# Patient Record
Sex: FEMALE | Race: WHITE | NOT HISPANIC OR LATINO | Employment: OTHER | URBAN - METROPOLITAN AREA
[De-identification: names, ages, dates, MRNs, and addresses within clinical notes are randomized per-mention and may not be internally consistent; named-entity substitution may affect disease eponyms.]

---

## 2024-07-20 ENCOUNTER — HOSPITAL ENCOUNTER (OUTPATIENT)
Facility: HOSPITAL | Age: 84
Discharge: HOME | End: 2024-07-22
Attending: STUDENT IN AN ORGANIZED HEALTH CARE EDUCATION/TRAINING PROGRAM | Admitting: INTERNAL MEDICINE
Payer: MEDICARE

## 2024-07-20 DIAGNOSIS — R06.02 SHORTNESS OF BREATH: ICD-10-CM

## 2024-07-20 DIAGNOSIS — I26.99 PULMONARY EMBOLISM: ICD-10-CM

## 2024-07-20 DIAGNOSIS — I26.99 PULMONARY EMBOLI: ICD-10-CM

## 2024-07-20 DIAGNOSIS — R07.9 CHEST PAIN: ICD-10-CM

## 2024-07-20 DIAGNOSIS — I26.99 ACUTE PULMONARY EMBOLISM WITHOUT ACUTE COR PULMONALE, UNSPECIFIED PULMONARY EMBOLISM TYPE: Primary | ICD-10-CM

## 2024-07-20 LAB
ALBUMIN SERPL-MCNC: 3.5 G/DL (ref 3.4–4.8)
ALBUMIN/GLOB SERPL: 1.1 RATIO (ref 1.1–2)
ALP SERPL-CCNC: 60 UNIT/L (ref 40–150)
ALT SERPL-CCNC: 132 UNIT/L (ref 0–55)
ANION GAP SERPL CALC-SCNC: 9 MEQ/L
AST SERPL-CCNC: 68 UNIT/L (ref 5–34)
BACTERIA #/AREA URNS AUTO: ABNORMAL /HPF
BASOPHILS # BLD AUTO: 0.02 X10(3)/MCL
BASOPHILS NFR BLD AUTO: 0.3 %
BILIRUB SERPL-MCNC: 1.4 MG/DL
BILIRUB UR QL STRIP.AUTO: NEGATIVE
BNP BLD-MCNC: 80.3 PG/ML
BUN SERPL-MCNC: 25.6 MG/DL (ref 9.8–20.1)
CALCIUM SERPL-MCNC: 9.6 MG/DL (ref 8.4–10.2)
CAOX CRY UR QL COMP ASSIST: ABNORMAL
CHLORIDE SERPL-SCNC: 105 MMOL/L (ref 98–107)
CLARITY UR: ABNORMAL
CO2 SERPL-SCNC: 20 MMOL/L (ref 23–31)
COLOR UR AUTO: YELLOW
CREAT SERPL-MCNC: 1.28 MG/DL (ref 0.55–1.02)
CREAT/UREA NIT SERPL: 20
EOSINOPHIL # BLD AUTO: 0.06 X10(3)/MCL (ref 0–0.9)
EOSINOPHIL NFR BLD AUTO: 0.9 %
ERYTHROCYTE [DISTWIDTH] IN BLOOD BY AUTOMATED COUNT: 15.5 % (ref 11.5–17)
GFR SERPLBLD CREATININE-BSD FMLA CKD-EPI: 42 ML/MIN/1.73/M2
GLOBULIN SER-MCNC: 3.2 GM/DL (ref 2.4–3.5)
GLUCOSE SERPL-MCNC: 137 MG/DL (ref 82–115)
GLUCOSE UR QL STRIP: NORMAL
HCT VFR BLD AUTO: 43.8 % (ref 37–47)
HGB BLD-MCNC: 14.8 G/DL (ref 12–16)
HGB UR QL STRIP: NEGATIVE
HYALINE CASTS #/AREA URNS LPF: ABNORMAL /LPF
IMM GRANULOCYTES # BLD AUTO: 0.05 X10(3)/MCL (ref 0–0.04)
IMM GRANULOCYTES NFR BLD AUTO: 0.8 %
KETONES UR QL STRIP: ABNORMAL
LEUKOCYTE ESTERASE UR QL STRIP: 250
LYMPHOCYTES # BLD AUTO: 1.43 X10(3)/MCL (ref 0.6–4.6)
LYMPHOCYTES NFR BLD AUTO: 22.2 %
MCH RBC QN AUTO: 30.6 PG (ref 27–31)
MCHC RBC AUTO-ENTMCNC: 33.8 G/DL (ref 33–36)
MCV RBC AUTO: 90.5 FL (ref 80–94)
MONOCYTES # BLD AUTO: 0.63 X10(3)/MCL (ref 0.1–1.3)
MONOCYTES NFR BLD AUTO: 9.8 %
MUCOUS THREADS URNS QL MICRO: ABNORMAL /LPF
NEUTROPHILS # BLD AUTO: 4.24 X10(3)/MCL (ref 2.1–9.2)
NEUTROPHILS NFR BLD AUTO: 66 %
NITRITE UR QL STRIP: NEGATIVE
NRBC BLD AUTO-RTO: 0 %
PH UR STRIP: 5.5 [PH]
PLATELET # BLD AUTO: 231 X10(3)/MCL (ref 130–400)
PMV BLD AUTO: 12.8 FL (ref 7.4–10.4)
POTASSIUM SERPL-SCNC: 4.8 MMOL/L (ref 3.5–5.1)
PROT SERPL-MCNC: 6.7 GM/DL (ref 5.8–7.6)
PROT UR QL STRIP: ABNORMAL
RBC # BLD AUTO: 4.84 X10(6)/MCL (ref 4.2–5.4)
RBC #/AREA URNS AUTO: ABNORMAL /HPF
SODIUM SERPL-SCNC: 134 MMOL/L (ref 136–145)
SP GR UR STRIP.AUTO: 1.04 (ref 1–1.03)
SQUAMOUS #/AREA URNS LPF: ABNORMAL /HPF
TROPONIN I SERPL-MCNC: <0.01 NG/ML (ref 0–0.04)
UROBILINOGEN UR STRIP-ACNC: 2
WBC # BLD AUTO: 6.43 X10(3)/MCL (ref 4.5–11.5)
WBC #/AREA URNS AUTO: ABNORMAL /HPF

## 2024-07-20 PROCEDURE — 25000003 PHARM REV CODE 250: Performed by: STUDENT IN AN ORGANIZED HEALTH CARE EDUCATION/TRAINING PROGRAM

## 2024-07-20 PROCEDURE — 81001 URINALYSIS AUTO W/SCOPE: CPT | Performed by: NURSE PRACTITIONER

## 2024-07-20 PROCEDURE — G0378 HOSPITAL OBSERVATION PER HR: HCPCS

## 2024-07-20 PROCEDURE — 99285 EMERGENCY DEPT VISIT HI MDM: CPT | Mod: 25

## 2024-07-20 PROCEDURE — 25500020 PHARM REV CODE 255: Performed by: STUDENT IN AN ORGANIZED HEALTH CARE EDUCATION/TRAINING PROGRAM

## 2024-07-20 PROCEDURE — 87086 URINE CULTURE/COLONY COUNT: CPT | Performed by: NURSE PRACTITIONER

## 2024-07-20 PROCEDURE — 83880 ASSAY OF NATRIURETIC PEPTIDE: CPT | Performed by: NURSE PRACTITIONER

## 2024-07-20 PROCEDURE — 93005 ELECTROCARDIOGRAM TRACING: CPT

## 2024-07-20 PROCEDURE — 93010 ELECTROCARDIOGRAM REPORT: CPT | Mod: 76,,, | Performed by: INTERNAL MEDICINE

## 2024-07-20 PROCEDURE — 25000003 PHARM REV CODE 250: Performed by: INTERNAL MEDICINE

## 2024-07-20 PROCEDURE — 84484 ASSAY OF TROPONIN QUANT: CPT | Performed by: NURSE PRACTITIONER

## 2024-07-20 PROCEDURE — 80053 COMPREHEN METABOLIC PANEL: CPT | Performed by: NURSE PRACTITIONER

## 2024-07-20 PROCEDURE — 85025 COMPLETE CBC W/AUTO DIFF WBC: CPT | Performed by: NURSE PRACTITIONER

## 2024-07-20 PROCEDURE — 96361 HYDRATE IV INFUSION ADD-ON: CPT

## 2024-07-20 PROCEDURE — 96372 THER/PROPH/DIAG INJ SC/IM: CPT | Performed by: STUDENT IN AN ORGANIZED HEALTH CARE EDUCATION/TRAINING PROGRAM

## 2024-07-20 PROCEDURE — 96374 THER/PROPH/DIAG INJ IV PUSH: CPT

## 2024-07-20 PROCEDURE — 99291 CRITICAL CARE FIRST HOUR: CPT

## 2024-07-20 PROCEDURE — 87077 CULTURE AEROBIC IDENTIFY: CPT | Performed by: NURSE PRACTITIONER

## 2024-07-20 PROCEDURE — 96375 TX/PRO/DX INJ NEW DRUG ADDON: CPT

## 2024-07-20 PROCEDURE — 63600175 PHARM REV CODE 636 W HCPCS: Performed by: STUDENT IN AN ORGANIZED HEALTH CARE EDUCATION/TRAINING PROGRAM

## 2024-07-20 PROCEDURE — 93010 ELECTROCARDIOGRAM REPORT: CPT | Mod: ,,, | Performed by: INTERNAL MEDICINE

## 2024-07-20 RX ORDER — ONDANSETRON HYDROCHLORIDE 2 MG/ML
4 INJECTION, SOLUTION INTRAVENOUS EVERY 8 HOURS PRN
Status: DISCONTINUED | OUTPATIENT
Start: 2024-07-20 | End: 2024-07-22 | Stop reason: HOSPADM

## 2024-07-20 RX ORDER — LIDOCAINE HYDROCHLORIDE 20 MG/ML
15 SOLUTION OROPHARYNGEAL ONCE
Status: COMPLETED | OUTPATIENT
Start: 2024-07-20 | End: 2024-07-20

## 2024-07-20 RX ORDER — ACETAMINOPHEN 325 MG/1
650 TABLET ORAL EVERY 8 HOURS PRN
Status: DISCONTINUED | OUTPATIENT
Start: 2024-07-20 | End: 2024-07-22 | Stop reason: HOSPADM

## 2024-07-20 RX ORDER — MORPHINE SULFATE 4 MG/ML
4 INJECTION, SOLUTION INTRAMUSCULAR; INTRAVENOUS
Status: COMPLETED | OUTPATIENT
Start: 2024-07-20 | End: 2024-07-20

## 2024-07-20 RX ORDER — SPIRONOLACTONE 50 MG/1
50 TABLET, FILM COATED ORAL DAILY
COMMUNITY

## 2024-07-20 RX ORDER — ENOXAPARIN SODIUM 100 MG/ML
1 INJECTION SUBCUTANEOUS
Status: COMPLETED | OUTPATIENT
Start: 2024-07-20 | End: 2024-07-20

## 2024-07-20 RX ORDER — ALBUTEROL SULFATE 90 UG/1
1 AEROSOL, METERED RESPIRATORY (INHALATION) EVERY 8 HOURS PRN
COMMUNITY
Start: 2024-07-19

## 2024-07-20 RX ORDER — ONDANSETRON HYDROCHLORIDE 2 MG/ML
4 INJECTION, SOLUTION INTRAVENOUS
Status: COMPLETED | OUTPATIENT
Start: 2024-07-20 | End: 2024-07-20

## 2024-07-20 RX ORDER — SODIUM CHLORIDE 0.9 % (FLUSH) 0.9 %
10 SYRINGE (ML) INJECTION
Status: DISCONTINUED | OUTPATIENT
Start: 2024-07-20 | End: 2024-07-22 | Stop reason: HOSPADM

## 2024-07-20 RX ORDER — SODIUM CHLORIDE 9 MG/ML
1000 INJECTION, SOLUTION INTRAVENOUS
Status: COMPLETED | OUTPATIENT
Start: 2024-07-20 | End: 2024-07-20

## 2024-07-20 RX ORDER — CLOPIDOGREL BISULFATE 75 MG/1
75 TABLET ORAL DAILY
Status: ON HOLD | COMMUNITY
End: 2024-07-22 | Stop reason: HOSPADM

## 2024-07-20 RX ORDER — KETOROLAC TROMETHAMINE 30 MG/ML
15 INJECTION, SOLUTION INTRAMUSCULAR; INTRAVENOUS EVERY 6 HOURS PRN
Status: DISCONTINUED | OUTPATIENT
Start: 2024-07-20 | End: 2024-07-21

## 2024-07-20 RX ORDER — TALC
6 POWDER (GRAM) TOPICAL NIGHTLY PRN
Status: DISCONTINUED | OUTPATIENT
Start: 2024-07-20 | End: 2024-07-22 | Stop reason: HOSPADM

## 2024-07-20 RX ORDER — ROSUVASTATIN CALCIUM 10 MG/1
10 TABLET, COATED ORAL DAILY
COMMUNITY

## 2024-07-20 RX ORDER — ALUMINUM HYDROXIDE, MAGNESIUM HYDROXIDE, AND SIMETHICONE 1200; 120; 1200 MG/30ML; MG/30ML; MG/30ML
30 SUSPENSION ORAL ONCE
Status: COMPLETED | OUTPATIENT
Start: 2024-07-20 | End: 2024-07-20

## 2024-07-20 RX ADMIN — LIDOCAINE HYDROCHLORIDE 15 ML: 20 SOLUTION ORAL at 09:07

## 2024-07-20 RX ADMIN — SODIUM CHLORIDE 1000 ML: 9 INJECTION, SOLUTION INTRAVENOUS at 06:07

## 2024-07-20 RX ADMIN — IOHEXOL 75 ML: 350 INJECTION, SOLUTION INTRAVENOUS at 06:07

## 2024-07-20 RX ADMIN — ONDANSETRON 4 MG: 2 INJECTION INTRAMUSCULAR; INTRAVENOUS at 07:07

## 2024-07-20 RX ADMIN — ALUMINUM HYDROXIDE, MAGNESIUM HYDROXIDE, AND SIMETHICONE 30 ML: 200; 200; 20 SUSPENSION ORAL at 09:07

## 2024-07-20 RX ADMIN — ENOXAPARIN SODIUM 70 MG: 100 INJECTION SUBCUTANEOUS at 08:07

## 2024-07-20 RX ADMIN — MORPHINE SULFATE 4 MG: 4 INJECTION INTRAVENOUS at 07:07

## 2024-07-20 NOTE — Clinical Note
Diagnosis: Acute pulmonary embolism without acute cor pulmonale, unspecified pulmonary embolism type [[id number]]   Future Attending Provider: ZION DILLARD [[id number]]   Admit to which facility:: [facility] [[id number]]

## 2024-07-20 NOTE — ED PROVIDER NOTES
"Encounter Date: 7/20/2024    SCRIBE #1 NOTE: I, Robert Greer, am scribing for, and in the presence of,  Boubacar Angeles IV, MD. I have scribed the following portions of the note - the EKG reading. Other sections scribed: HPI, ROS, PE.       History     Chief Complaint   Patient presents with    Shortness of Breath     SOB x several mos. Recent dx of PNA and states has progressively made it worse. Sees a pulmonologist in [place], states no "definitive dx" to answer as to why she is SOB. Recent fall yesterday @ 1400, hit her R. Flank. Had x-rays done @  and were unremarkable. Denies hitting head or LOC.     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 Pt's spouse at the bedside reports that pt has had difficulty breathing since January. She was diagnosed with the flu at the time hasn't improved since then. Two weeks ago she was diagnosed with pneumonia and her condition is still declining. Pt recently visited a pulmonologist who had a chest X.ray done and informed pt that there was no concern of any issues for someone of her age. Pt is presenting here for a second opinion. She is also experiencing cough and appetite loss in addition to the persistent SOB. She notes that she visited an urgent care yesterday after falling and hit her right side on a toilet. Her X-ray was negative and she was discharged and put on Trelegy for her other symptoms. Reports that she had a recent CAT scan done that showed scarring in her lungs. Denies leg swelling.    The history is provided by the patient and a relative. No  was used.     Review of patient's allergies indicates:   Allergen Reactions    Mepergan fortis Hives     No past medical history on file.  No past surgical history on file.  No family history on file.     Review of Systems   Constitutional:  Positive for appetite change. Negative for chills and " fever.   HENT:  Negative for congestion, rhinorrhea and sore throat.    Eyes:  Negative for visual disturbance.   Respiratory:  Positive for cough and shortness of breath.    Cardiovascular:  Negative for chest pain and leg swelling.   Gastrointestinal:  Negative for abdominal pain, nausea and vomiting.   Genitourinary:  Negative for dysuria, hematuria, vaginal bleeding and vaginal discharge.   Musculoskeletal:  Negative for joint swelling.   Skin:  Negative for rash.   Neurological:  Negative for weakness.   Psychiatric/Behavioral:  Negative for confusion.        Physical Exam     Initial Vitals [07/20/24 1651]   BP Pulse Resp Temp SpO2   (!) 143/88 69 20 97.9 °F (36.6 °C) 98 %      MAP       --         Physical Exam    Nursing note and vitals reviewed.  Constitutional: She is not diaphoretic. No distress.   Chronically ill appearing   HENT:   Head: Normocephalic and atraumatic.   Neck: Neck supple.   Normal range of motion.  Cardiovascular:  Normal rate and regular rhythm.           No murmur heard.  Pulmonary/Chest: Breath sounds normal. No respiratory distress.   R chest wall tenderness  Actively coughing   Abdominal: Abdomen is soft. She exhibits no distension. There is no abdominal tenderness.   Musculoskeletal:      Cervical back: Normal range of motion and neck supple.     Neurological: She is alert and oriented to person, place, and time. She has normal strength. No cranial nerve deficit or sensory deficit.   Skin: Skin is warm. Capillary refill takes less than 2 seconds.   Psychiatric: She has a normal mood and affect.         ED Course   Critical Care    Date/Time: 7/21/2024 5:40 PM    Performed by: Boubacar Angeles IV, MD  Authorized by: Boubacar Angeles IV, MD  Total critical care time (exclusive of procedural time) : 35 minutes  Critical care time was exclusive of separately billable procedures and treating other patients.  Critical care was necessary to treat or prevent imminent or life-threatening  deterioration of the following conditions: cardiac failure and respiratory failure.  Critical care was time spent personally by me on the following activities: blood draw for specimens, discussions with consultants, development of treatment plan with patient or surrogate, examination of patient, interpretation of cardiac output measurements, evaluation of patient's response to treatment, obtaining history from patient or surrogate, ordering and performing treatments and interventions, ordering and review of laboratory studies, ordering and review of radiographic studies, re-evaluation of patient's condition, pulse oximetry and review of old charts.        Labs Reviewed   COMPREHENSIVE METABOLIC PANEL - Abnormal       Result Value    Sodium 134 (*)     Potassium 4.8      Chloride 105      CO2 20 (*)     Glucose 137 (*)     Blood Urea Nitrogen 25.6 (*)     Creatinine 1.28 (*)     Calcium 9.6      Protein Total 6.7      Albumin 3.5      Globulin 3.2      Albumin/Globulin Ratio 1.1      Bilirubin Total 1.4      ALP 60       (*)     AST 68 (*)     eGFR 42      Anion Gap 9.0      BUN/Creatinine Ratio 20     URINALYSIS, REFLEX TO URINE CULTURE - Abnormal    Color, UA Yellow      Appearance, UA Turbid (*)     Specific Gravity, UA 1.039 (*)     pH, UA 5.5      Protein, UA Trace (*)     Glucose, UA Normal      Ketones, UA Trace (*)     Blood, UA Negative      Bilirubin, UA Negative      Urobilinogen, UA 2.0 (*)     Nitrites, UA Negative      Leukocyte Esterase,  (*)     RBC, UA 6-10 (*)     WBC, UA 11-20 (*)     Bacteria, UA Trace      Squamous Epithelial Cells, UA Trace      Mucous, UA Moderate (*)     Hyaline Casts, UA 6-10 (*)     Calcium Oxalate Crystals, UA Moderate (*)    CBC WITH DIFFERENTIAL - Abnormal    WBC 6.43      RBC 4.84      Hgb 14.8      Hct 43.8      MCV 90.5      MCH 30.6      MCHC 33.8      RDW 15.5      Platelet 231      MPV 12.8 (*)     Neut % 66.0      Lymph % 22.2      Mono % 9.8      Eos  % 0.9      Basophil % 0.3      Lymph # 1.43      Neut # 4.24      Mono # 0.63      Eos # 0.06      Baso # 0.02      IG# 0.05 (*)     IG% 0.8      NRBC% 0.0     TROPONIN I - Normal    Troponin-I <0.010     B-TYPE NATRIURETIC PEPTIDE - Normal    Natriuretic Peptide 80.3     CBC W/ AUTO DIFFERENTIAL    Narrative:     The following orders were created for panel order CBC Auto Differential.  Procedure                               Abnormality         Status                     ---------                               -----------         ------                     CBC with Differential[[id number]]       Abnormal            Final result                 Please view results for these tests on the individual orders.     EKG Readings: (Independently Interpreted)   Initial Reading: No STEMI. Rhythm: Normal Sinus Rhythm. Heart Rate: 68. Ectopy: No Ectopy. Conduction: Normal. ST Segments: Normal ST Segments. T Waves: Normal. Axis: Normal.   Time: 16:49     ECG Results              EKG 12-lead (Final result)        Collection Time Result Time QRS Duration OHS QTC Calculation    07/20/24 20:45:50 07/21/24 16:12:21 90 466                     Final result by Interface, Lab In [facility] (07/21/24 16:12:31)                   Narrative:    Test Reason : R06.02,    Vent. Rate : 062 BPM     Atrial Rate : 062 BPM     P-R Int : 198 ms          QRS Dur : 090 ms      QT Int : 460 ms       P-R-T Axes : 042 -08 031 degrees     QTc Int : 466 ms    Normal sinus rhythm  Minimal voltage criteria for LVH, may be normal variant ( R in aVL )  Borderline Abnormal ECG  When compared with ECG of 20-JUL-2024 16:49,  No significant change was found  Confirmed by Tiarra Bo MD ([id number]) on 7/21/2024 4:12:17 PM    Referred By: [email or web address]   SELF           Confirmed By:Tiarra Bo MD                                     EKG 12-lead (Final result)        Collection Time Result Time QRS Duration OHS QTC Calculation    07/20/24 16:49:41 07/21/24 15:29:39 82 444                      Final result by Interface, Lab In [facility] (07/21/24 15:29:43)                   Narrative:    Test Reason : R06.02,    Vent. Rate : 068 BPM     Atrial Rate : 068 BPM     P-R Int : 190 ms          QRS Dur : 082 ms      QT Int : 418 ms       P-R-T Axes : 054 -02 024 degrees     QTc Int : 444 ms    Normal sinus rhythm  Normal ECG  No previous ECGs available  Confirmed by Tiarra Bo MD ([id number]) on 7/21/2024 3:29:35 PM    Referred By:             Confirmed By:Tiarra Bo MD                                  Imaging Results              CT Chest With Contrast (Edited Result - FINAL)  Result time 07/20/24 20:14:28      Addendum (preliminary) 1 of 1 by Meenu Mills MD (07/20/24 20:14:28)      Findings discussed with  Boubacar Angeles Iv, the clinician caring for patient 7/20/2024 18:57.      Electronically signed by: Meenu Mills  Date:    07/20/2024  Time:    20:14                 Final result by Meenu Mills MD (07/20/24 18:55:07)                   Impression:      1. Linear filling defect within a right lower lobe segmental arterial branch compatible with small pulmonary embolus, age indeterminate.  Small clot burden.  No evidence of right heart strain.  2. Mosaic attenuation at the lung bases as can be seen with mosaic perfusion or air trapping.  There is interstitial reticulation which may be related to scarring or interstitial lung disease.  There is a report from an outside prior CT from 2023 which describes mosaic attenuation within the lungs.  It would be helpful to compare to the outside prior images to assess for chronic disease..      Electronically signed by: Meenu Mills  Date:    07/20/2024  Time:    18:55               Narrative:    EXAMINATION:  CT CHEST WITH CONTRAST    CLINICAL HISTORY:  Respiratory illness, nondiagnostic xray;    TECHNIQUE:  Helical acquired axial images, sagittal and coronal reformations were obtained from the thoracic inlet to the lung bases  following the IV administration of contrast.    Automated tube current modulation, weight-based exposure dosing, and/or iterative reconstruction technique utilized to reach lowest reasonably achievable exposure rate.    DLP: 208 mGy*cm    COMPARISON:  No relevant priors available for comparison at time of this dictation    FINDINGS:  BASE OF NECK: No significant abnormality.    AORTA: Aortic atherosclerosis.    HEART: Normal size. No effusion. There are coronary artery calcifications.  No right heart strain.    PULMONARY VASCULATURE: Very subtle linear filling defect within a segmental branch of the right lower lobe (2, 57).    COLLIN/MEDIASTINUM: No enlarged lymph nodes by size criteria.    AIRWAYS: Patent.    LUNGS/PLEURA: Mosaic attenuation at the lung bases with interstitial reticulation.  Similar findings were described on a report from an outside CT exam dated 02/11/2023.  These images are not available for direct comparison.  No pleural effusion or pneumothorax.    UPPER ABDOMEN: No abnormality of the partially imaged upper abdomen.    THORACIC SOFT TISSUES: Unremarkable.    BONES: No acute fracture. No suspicious lytic or sclerotic lesions.                                       X-Ray Chest PA And Lateral (Final result)  Result time 07/20/24 17:04:32      Final result by Meenu Mills MD (07/20/24 17:04:32)                   Impression:      No acute cardiopulmonary abnormality.      Electronically signed by: Meenu Mills  Date:    07/20/2024  Time:    17:04               Narrative:    EXAMINATION:  XR CHEST PA AND LATERAL    CLINICAL HISTORY:  Shortness of breath    TECHNIQUE:  Two views of the chest    COMPARISON:  No relevant prior available at the time of dictation.    FINDINGS:  LINES AND TUBES: None    MEDIASTINUM AND COLLIN: The cardiac silhouette is normal. There is a coronary stent.  Aortic atherosclerosis.    LUNGS: Mild linear atelectasis versus scarring at the left lung base.    PLEURA:No  pleural effusion. No pneumothorax.    BONES: No acute osseous abnormality.                                       Medications   sodium chloride 0.9% flush 10 mL (has no administration in time range)   melatonin tablet 6 mg (has no administration in time range)   acetaminophen tablet 650 mg (has no administration in time range)   ondansetron injection 4 mg (has no administration in time range)   rivaroxaban tablet 15 mg (15 mg Oral Given 7/21/24 1634)   aspirin EC tablet 81 mg (81 mg Oral Given 7/21/24 0843)   spironolactone tablet 50 mg (50 mg Oral Given 7/21/24 0842)   traMADoL tablet 50 mg (50 mg Oral Given 7/21/24 1542)   iohexoL (OMNIPAQUE 350) injection 75 mL (75 mLs Intravenous Given 7/20/24 1839)   0.9%  NaCl infusion (0 mLs Intravenous Stopped 7/20/24 2100)   morphine injection 4 mg (4 mg Intravenous Given 7/20/24 1921)   ondansetron injection 4 mg (4 mg Intravenous Given 7/20/24 1921)   enoxaparin injection 70 mg (70 mg Subcutaneous Given 7/20/24 2053)   aluminum-magnesium hydroxide-simethicone 200-200-20 mg/5 mL suspension 30 mL (30 mLs Oral Given 7/20/24 2151)     And   LIDOcaine viscous HCl 2% oral solution 15 mL (15 mLs Oral Given 7/20/24 2152)     Medical Decision Making  84 yo with progressive SOB, cough, overall decline over past few months  Exam above  Recent fall yesterday with R rib pain  Workup with small PE, otherwise workup uremarkable  Will start anticoagulation, admit     The differential diagnosis includes, but is not limited to:  Judging by the patient's chief complaint and pertinent history, the patient has the following possible differential diagnoses, including but not limited to the following.  Some of these are deemed to be lower likelihood and some more likely based on my physical exam and history combined with possible lab work and/or imaging studies.   Please see the pertinent studies, and refer to the HPI.  Some of these diagnoses will take further evaluation to fully rule out,  perhaps as an outpatient and the patient was encouraged to follow up when discharged for more comprehensive evaluation.    ACS, pneumonia, COVID/Flu, congestive heart failure, asthma, COPD, pleural effusion, pulmonary edema, acute bronchitis, PE, pneumothorax, hemothorax, aortic dissection, electrolyte abnormalities, anemia, anxiety       Problems Addressed:  Acute pulmonary embolism without acute cor pulmonale, unspecified pulmonary embolism type: acute illness or injury that poses a threat to life or bodily functions  Shortness of breath: acute illness or injury that poses a threat to life or bodily functions    Amount and/or Complexity of Data Reviewed  Independent Historian: spouse     Details: Pt's spouse at the bedside reports that pt has had difficulty breathing since January. She was diagnosed with the flu at the time hasn't improved since then. Two weeks ago she was diagnosed with pneumonia and her condition is still declining. Pt recently visited a pulmonologist who had a chest X.ray done and informed pt that there was no concern of any issues for someone of her age. Pt is presenting here for a second opinion. She is also experiencing cough and appetite loss in addition to the persistent SOB.  Labs: ordered.  Radiology: ordered.  ECG/medicine tests: ordered and independent interpretation performed.     Details: EKG performed at 16:49. Initial Reading: No STEMI. Rhythm: Normal Sinus Rhythm. Heart Rate: 68. Ectopy: No Ectopy. Conduction: Normal. ST Segments: Normal ST Segments. T Waves: Normal. Axis: Normal.   Discussion of management or test interpretation with external provider(s): Discussed with the hospitalist will admit    Risk  Prescription drug management.  Drug therapy requiring intensive monitoring for toxicity.  Decision regarding hospitalization.    Critical Care  Total time providing critical care: 35 minutes            Scribe Attestation:   Scribe #1: I performed the above scribed service and  the documentation accurately describes the services I performed. I attest to the accuracy of the note.    Attending Attestation:           Physician Attestation for Scribe:  Physician Attestation Statement for Scribe #1: I, Boubacar Angeles IV, MD, reviewed documentation, as scribed by Robert Greer in my presence, and it is both accurate and complete.             ED Course as of 07/21/24 1740   Sat Jul 20, 2024 1953 Radiologist did inform me of finding of acute PE no right heart strain on CT chest  Report has not crossed over yet  Will treat with [facility] hospitalist consulted and will admit [AC]      ED Course User Index  [AC] Boubacar Angeles IV, MD                           Clinical Impression:  Final diagnoses:  [R06.02] Shortness of breath  [I26.99] Acute pulmonary embolism without acute cor pulmonale, unspecified pulmonary embolism type (Primary)          ED Disposition Condition    Observation Stable                Boubacar Angeles IV, MD  07/21/24 9677

## 2024-07-20 NOTE — FIRST PROVIDER EVALUATION
Medical screening examination initiated.  I have conducted a focused provider triage encounter, findings are as follows:    Brief history of present illness:  82y/o F presents to the ED with SOB for the past few weeks. Patient reports falling on yesterday seen at a local urgent care and was told no fracture ribs noted. States no relief with inhalers at home.     There were no vitals filed for this visit.    Pertinent physical exam:  AAA x 3    Brief workup plan:  Labs/EKG/IMAGING     Preliminary workup initiated; this workup will be continued and followed by the physician or advanced practice provider that is assigned to the patient when roomed.

## 2024-07-21 LAB
ALBUMIN SERPL-MCNC: 2.7 G/DL (ref 3.4–4.8)
ALBUMIN/GLOB SERPL: 1.1 RATIO (ref 1.1–2)
ALP SERPL-CCNC: 68 UNIT/L (ref 40–150)
ALT SERPL-CCNC: 113 UNIT/L (ref 0–55)
ANION GAP SERPL CALC-SCNC: 5 MEQ/L
APICAL FOUR CHAMBER EJECTION FRACTION: 60 %
APICAL TWO CHAMBER EJECTION FRACTION: 61 %
AST SERPL-CCNC: 82 UNIT/L (ref 5–34)
AV INDEX (PROSTH): 0.61
AV MEAN GRADIENT: 7 MMHG
AV PEAK GRADIENT: 13 MMHG
AV REGURGITATION PRESSURE HALF TIME: 594 MS
AV VALVE AREA BY VELOCITY RATIO: 1.8 CM²
AV VALVE AREA: 2.12 CM²
AV VELOCITY RATIO: 0.52
BASOPHILS # BLD AUTO: 0.01 X10(3)/MCL
BASOPHILS NFR BLD AUTO: 0.2 %
BILIRUB SERPL-MCNC: 1.1 MG/DL
BSA FOR ECHO PROCEDURE: 1.87 M2
BUN SERPL-MCNC: 18.3 MG/DL (ref 9.8–20.1)
CALCIUM SERPL-MCNC: 8.3 MG/DL (ref 8.4–10.2)
CHLORIDE SERPL-SCNC: 111 MMOL/L (ref 98–107)
CO2 SERPL-SCNC: 21 MMOL/L (ref 23–31)
CREAT SERPL-MCNC: 0.91 MG/DL (ref 0.55–1.02)
CREAT/UREA NIT SERPL: 20
CV ECHO LV RWT: 0.43 CM
DOP CALC AO PEAK VEL: 1.77 M/S
DOP CALC AO VTI: 40.4 CM
DOP CALC LVOT AREA: 3.5 CM2
DOP CALC LVOT DIAMETER: 2.1 CM
DOP CALC LVOT PEAK VEL: 0.92 M/S
DOP CALC LVOT STROKE VOLUME: 85.51 CM3
DOP CALC MV VTI: 31.4 CM
DOP CALCLVOT PEAK VEL VTI: 24.7 CM
E WAVE DECELERATION TIME: 227 MSEC
E/A RATIO: 0.78
E/E' RATIO: 8.59 M/S
ECHO LV POSTERIOR WALL: 1 CM (ref 0.6–1.1)
EOSINOPHIL # BLD AUTO: 0.04 X10(3)/MCL (ref 0–0.9)
EOSINOPHIL NFR BLD AUTO: 0.8 %
ERYTHROCYTE [DISTWIDTH] IN BLOOD BY AUTOMATED COUNT: 15.8 % (ref 11.5–17)
FRACTIONAL SHORTENING: 26 % (ref 28–44)
GFR SERPLBLD CREATININE-BSD FMLA CKD-EPI: >60 ML/MIN/1.73/M2
GLOBULIN SER-MCNC: 2.5 GM/DL (ref 2.4–3.5)
GLUCOSE SERPL-MCNC: 117 MG/DL (ref 82–115)
HCT VFR BLD AUTO: 39 % (ref 37–47)
HGB BLD-MCNC: 13.2 G/DL (ref 12–16)
IMM GRANULOCYTES # BLD AUTO: 0.04 X10(3)/MCL (ref 0–0.04)
IMM GRANULOCYTES NFR BLD AUTO: 0.8 %
INTERVENTRICULAR SEPTUM: 1 CM (ref 0.6–1.1)
LEFT ATRIUM AREA SYSTOLIC (APICAL 2 CHAMBER): 10.8 CM2
LEFT ATRIUM AREA SYSTOLIC (APICAL 4 CHAMBER): 12.5 CM2
LEFT ATRIUM SIZE: 3.3 CM
LEFT ATRIUM VOLUME INDEX MOD: 14.6 ML/M2
LEFT ATRIUM VOLUME MOD: 27.1 CM3
LEFT INTERNAL DIMENSION IN SYSTOLE: 3.4 CM (ref 2.1–4)
LEFT VENTRICLE DIASTOLIC VOLUME INDEX: 52.59 ML/M2
LEFT VENTRICLE DIASTOLIC VOLUME: 97.3 ML
LEFT VENTRICLE END DIASTOLIC VOLUME APICAL 2 CHAMBER: 42.7 ML
LEFT VENTRICLE END DIASTOLIC VOLUME APICAL 4 CHAMBER: 49.4 ML
LEFT VENTRICLE END SYSTOLIC VOLUME APICAL 2 CHAMBER: 23.9 ML
LEFT VENTRICLE END SYSTOLIC VOLUME APICAL 4 CHAMBER: 26 ML
LEFT VENTRICLE MASS INDEX: 86 G/M2
LEFT VENTRICLE SYSTOLIC VOLUME INDEX: 25.6 ML/M2
LEFT VENTRICLE SYSTOLIC VOLUME: 47.4 ML
LEFT VENTRICULAR INTERNAL DIMENSION IN DIASTOLE: 4.6 CM (ref 3.5–6)
LEFT VENTRICULAR MASS: 158.81 G
LV LATERAL E/E' RATIO: 7.3 M/S
LV SEPTAL E/E' RATIO: 10.43 M/S
LVED V (TEICH): 97.3 ML
LVES V (TEICH): 47.4 ML
LVOT MG: 2 MMHG
LVOT MV: 0.65 CM/S
LYMPHOCYTES # BLD AUTO: 1.2 X10(3)/MCL (ref 0.6–4.6)
LYMPHOCYTES NFR BLD AUTO: 23.3 %
MCH RBC QN AUTO: 30.8 PG (ref 27–31)
MCHC RBC AUTO-ENTMCNC: 33.8 G/DL (ref 33–36)
MCV RBC AUTO: 91.1 FL (ref 80–94)
MONOCYTES # BLD AUTO: 0.6 X10(3)/MCL (ref 0.1–1.3)
MONOCYTES NFR BLD AUTO: 11.7 %
MV MEAN GRADIENT: 1 MMHG
MV PEAK A VEL: 0.93 M/S
MV PEAK E VEL: 0.73 M/S
MV PEAK GRADIENT: 3 MMHG
MV STENOSIS PRESSURE HALF TIME: 151 MS
MV VALVE AREA BY CONTINUITY EQUATION: 2.72 CM2
MV VALVE AREA P 1/2 METHOD: 1.46 CM2
NEUTROPHILS # BLD AUTO: 3.26 X10(3)/MCL (ref 2.1–9.2)
NEUTROPHILS NFR BLD AUTO: 63.2 %
NRBC BLD AUTO-RTO: 0 %
OHS LV EJECTION FRACTION SIMPSONS BIPLANE MOD: 61 %
OHS QRS DURATION: 82 MS
OHS QRS DURATION: 90 MS
OHS QTC CALCULATION: 444 MS
OHS QTC CALCULATION: 466 MS
PISA AR MAX VEL: 4.01 M/S
PISA TR MAX VEL: 1.85 M/S
PLATELET # BLD AUTO: 162 X10(3)/MCL (ref 130–400)
PMV BLD AUTO: 12.4 FL (ref 7.4–10.4)
POTASSIUM SERPL-SCNC: 4.5 MMOL/L (ref 3.5–5.1)
PROT SERPL-MCNC: 5.2 GM/DL (ref 5.8–7.6)
PV PEAK GRADIENT: 3 MMHG
PV PEAK VELOCITY: 0.81 M/S
RA PRESSURE ESTIMATED: 3 MMHG
RBC # BLD AUTO: 4.28 X10(6)/MCL (ref 4.2–5.4)
RV TB RVSP: 5 MMHG
RV TISSUE DOPPLER FREE WALL SYSTOLIC VELOCITY 1 (APICAL 4 CHAMBER VIEW): 15.4 CM/S
SODIUM SERPL-SCNC: 137 MMOL/L (ref 136–145)
TDI LATERAL: 0.1 M/S
TDI SEPTAL: 0.07 M/S
TDI: 0.09 M/S
TR MAX PG: 14 MMHG
TRICUSPID ANNULAR PLANE SYSTOLIC EXCURSION: 2.05 CM
TV REST PULMONARY ARTERY PRESSURE: 17 MMHG
WBC # BLD AUTO: 5.15 X10(3)/MCL (ref 4.5–11.5)
Z-SCORE OF LEFT VENTRICULAR DIMENSION IN END DIASTOLE: -1.11
Z-SCORE OF LEFT VENTRICULAR DIMENSION IN END SYSTOLE: 0.55

## 2024-07-21 PROCEDURE — 80053 COMPREHEN METABOLIC PANEL: CPT | Performed by: INTERNAL MEDICINE

## 2024-07-21 PROCEDURE — G0378 HOSPITAL OBSERVATION PER HR: HCPCS

## 2024-07-21 PROCEDURE — 25000003 PHARM REV CODE 250: Performed by: INTERNAL MEDICINE

## 2024-07-21 PROCEDURE — 36415 COLL VENOUS BLD VENIPUNCTURE: CPT | Performed by: INTERNAL MEDICINE

## 2024-07-21 PROCEDURE — 85025 COMPLETE CBC W/AUTO DIFF WBC: CPT | Performed by: INTERNAL MEDICINE

## 2024-07-21 RX ORDER — ASPIRIN 81 MG/1
81 TABLET ORAL DAILY
Status: DISCONTINUED | OUTPATIENT
Start: 2024-07-21 | End: 2024-07-22 | Stop reason: HOSPADM

## 2024-07-21 RX ORDER — SPIRONOLACTONE 25 MG/1
50 TABLET ORAL DAILY
Status: DISCONTINUED | OUTPATIENT
Start: 2024-07-21 | End: 2024-07-22 | Stop reason: HOSPADM

## 2024-07-21 RX ORDER — TRAMADOL HYDROCHLORIDE 50 MG/1
50 TABLET ORAL EVERY 6 HOURS PRN
Status: DISCONTINUED | OUTPATIENT
Start: 2024-07-21 | End: 2024-07-22 | Stop reason: HOSPADM

## 2024-07-21 RX ADMIN — RIVAROXABAN 15 MG: 15 TABLET, FILM COATED ORAL at 08:07

## 2024-07-21 RX ADMIN — SPIRONOLACTONE 50 MG: 25 TABLET ORAL at 08:07

## 2024-07-21 RX ADMIN — ASPIRIN 81 MG: 81 TABLET, COATED ORAL at 08:07

## 2024-07-21 RX ADMIN — RIVAROXABAN 15 MG: 15 TABLET, FILM COATED ORAL at 04:07

## 2024-07-21 RX ADMIN — TRAMADOL HYDROCHLORIDE 50 MG: 50 TABLET, COATED ORAL at 03:07

## 2024-07-21 NOTE — PROGRESS NOTES
[facility] 6th Floor [facility] MEDICINE ~ PROGRESS NOTE        CHIEF COMPLAINT   Hospital follow up    HOSPITAL COURSE   84yo female with pmhx of CAD & PCI with stent, MI (2/23), DM2, HTN, HLD, CKD, Afib (Eliquis discontinued within last year by her cardiologist for unknown reasons) preseted to the ED for acute on chronic SOB not needing O2 since January. Dx with flu at that time with no complications but has not improved since then. Presented here for 2nd opinion after pulmonology outpatient workup revealed no significant findings. Associated with generalized fatigue, weakness, loss of appetite and thus less PO intake.      Pt denies CP, left arm pain, lower extremity swelling, lower extremity pain, lower extremity redness/warmth, clotting disorder.      ED course: VSS on RA. Initial EKG nsr. CXR insignificant. CTA here shows small right lower lobe segmental PE, small clot burden, no right heart strain / cor pulmonale. DVT study is positive for deep vein thrombosis identified in the right peroneal vein. PE. Tx: lovenox 1mg/kg, ivns, morphin, zofran, simethicone.     Today  Discussed findings of dvt and pe.  She is willign to pay for eliquis now, will discuss with cm to see if we can get her some coupons.    Check ultrasound liver as well given elevated enzymes, has a 50 lb weight loss in 1 year.        OBJECTIVE/PHYSICAL EXAM     VITAL SIGNS (MOST RECENT):  Temp: 97.4 °F (36.3 °C) (07/21/24 0837)  Pulse: (!) 56 (07/21/24 0837)  Resp: 18 (07/21/24 0837)  BP: (!) 142/80 (07/21/24 0837)  SpO2: 97 % (07/21/24 0837) VITAL SIGNS (24 HOUR RANGE):  Temp:  [97.4 °F (36.3 °C)-97.9 °F (36.6 °C)] 97.4 °F (36.3 °C)  Pulse:  [55-69] 56  Resp:  [16-25] 18  SpO2:  [94 %-99 %] 97 %  BP: (124-171)/(63-88) 142/80   GENERAL: In no acute distress, afebrile  HEENT:  CHEST: Clear to auscultation bilaterally  HEART: S1, S2, no appreciable murmur  ABDOMEN: Soft, nontender, BS +  MSK: Warm, no lower  extremity edema, no clubbing or cyanosis  NEUROLOGIC: Alert and oriented x4, moving all extremities with good strength   INTEGUMENTARY:  PSYCHIATRY:        ASSESSMENT/PLAN   Right lower lobe segmental pulmonary embolism  Right peroneal vein deep vein thrombosis  Mild acute kidney injury-resolved   Transaminitis  Significant weight loss history    History of: As listed above       Continue Xarelto as already started while in the hospital but on discharge we will see what is cheaper for her insurance Eliquis or Xarelto.    Check ultrasound liver given elevated enzymes and weight loss history  Start PT    she will need further workup with PCP if not already done regarding her significant weight loss history    DVT prophylaxis:  full-dose  Xarelto    Anticipated discharge and disposition:   __________________________________________________________________________    NUTRITIONAL STATUS     Patient meets ASPEN criteria for   malnutrition of   per RD assessment as evidenced by:                       A minimum of two characteristics is recommended for diagnosis of either severe or non-severe malnutrition.     LABS/MICRO/MEDS/DIAGNOSTICS       LABS  Recent Labs     07/21/24  0405      K 4.5   CO2 21*   BUN 18.3   CREATININE 0.91   GLUCOSE 117*   CALCIUM 8.3*   ALKPHOS 68   AST 82*   *   ALBUMIN 2.7*     Recent Labs     07/21/24  0405   WBC 5.15   RBC 4.28   HCT 39.0   MCV 91.1          MICROBIOLOGY  Microbiology Results (last 7 days)       Procedure Component Value Units Date/Time    Urine culture [[id number]] Collected: 07/20/24 1843    Order Status: Sent Specimen: Urine, Clean Catch Updated: 07/20/24 1917               MEDICATIONS   aspirin  81 mg Oral Daily    rivaroxaban  15 mg Oral BID WM    spironolactone  50 mg Oral Daily         INFUSIONS         DIAGNOSTIC TESTS  CT Chest With Contrast   Final Result   Addendum (preliminary) 1 of 1      Findings discussed with  Boubacar Angeles Iv, the clinician  caring for patient    7/20/2024 18:57.         Electronically signed by: Meenu Mills   Date:    07/20/2024   Time:    20:14      Final      1. Linear filling defect within a right lower lobe segmental arterial branch compatible with small pulmonary embolus, age indeterminate.  Small clot burden.  No evidence of right heart strain.   2. Mosaic attenuation at the lung bases as can be seen with mosaic perfusion or air trapping.  There is interstitial reticulation which may be related to scarring or interstitial lung disease.  There is a report from an outside prior CT from 2023 which describes mosaic attenuation within the lungs.  It would be helpful to compare to the outside prior images to assess for chronic disease..         Electronically signed by: Meenu Mills   Date:    07/20/2024   Time:    18:55      X-Ray Chest PA And Lateral   Final Result      No acute cardiopulmonary abnormality.         Electronically signed by: Meenu Mills   Date:    07/20/2024   Time:    17:04           No echocardiogram results found for the past 14 days.         Case related differential diagnoses have been reviewed; assessment and plan has been documented. I have personally reviewed the labs and test results that are currently available; I have reviewed the patients medication list. I have reviewed the consulting providers recommendations. I have reviewed or attempted to review medical records based upon their availability.  All of the patient's and/or family's questions have been addressed and answered to the best of my ability.  I will continue to monitor closely and make adjustments to medical management as needed.  This document was created using M*Modal Fluency Direct.  Transcription errors may have been made.  Please contact me if any questions may rise regarding documentation to clarify transcription.        Yuriy Luciano MD   Internal Medicine  Department of Hospital Medicine [facility] - 6th  Floor Medical Telemetry

## 2024-07-21 NOTE — PLAN OF CARE
Problem: Adult Inpatient Plan of Care  Goal: Plan of Care Review  7/21/2024 1625 by Clarisa Lyon RN  Outcome: Progressing  7/21/2024 1356 by Clarisa Lyon RN  Outcome: Progressing  Goal: Patient-Specific Goal (Individualized)  7/21/2024 1625 by Clarisa Lyon RN  Outcome: Progressing  7/21/2024 1356 by Clarisa Lyon RN  Outcome: Progressing  Goal: Absence of Hospital-Acquired Illness or Injury  7/21/2024 1625 by Clarisa Lyon RN  Outcome: Progressing  7/21/2024 1356 by Clarisa Lyon RN  Outcome: Progressing  Goal: Optimal Comfort and Wellbeing  7/21/2024 1625 by Clarisa Lyon RN  Outcome: Progressing  7/21/2024 1356 by Clarisa Lyon RN  Outcome: Progressing  Goal: Readiness for Transition of Care  7/21/2024 1625 by Clarisa Lyon RN  Outcome: Progressing  7/21/2024 1356 by Clarisa Lyon RN  Outcome: Progressing     Problem: Fall Injury Risk  Goal: Absence of Fall and Fall-Related Injury  7/21/2024 1625 by Clarisa Lyon RN  Outcome: Progressing  7/21/2024 1356 by Clarisa Lyon RN  Outcome: Progressing

## 2024-07-21 NOTE — NURSING
Nurses Note -- 4 Eyes      7/20/2024   11:21 PM      Skin assessed during: Admit      [x] No Altered Skin Integrity Present    [x]Prevention Measures Documented      [] Yes- Altered Skin Integrity Present or Discovered   [] LDA Added if Not in Epic (Describe Wound)   [] New Altered Skin Integrity was Present on Admit and Documented in LDA   [] Wound Image Taken    Wound Care Consulted? No    Attending Nurse:  Rosalina Mauricio RN    Second RN/Staff Member:   Shirley Marie Rn

## 2024-07-21 NOTE — H&P
"[facility] Hospital Medicine - H&P Note    Patient Name: Sharee Rodriguez  : 1940  MRN: [id number]  PCP: Simin, Primary Doctor  Admitting Physician: Roberto Leal MD  Admission Class: OP- Observation   Length of Stay: 0  Face-to-Face encounter date: 2024  Code status: full    Chief Complaint   Shortness of Breath (SOB x several mos. Recent dx of PNA and states has progressively made it worse. Sees a pulmonologist in [place], states no "definitive dx" to answer as to why she is SOB. Recent fall yesterday @ 1400, hit her R. Flank. Had x-rays done @  and were unremarkable. Denies hitting head or LOC.)      History of Present Illness   84yo female with pmhx of CAD & PCI with stent, MI (), DM2, HTN, HLD, CKD, Afib (Eliquis discontinued within last year by her cardiologist for unknown reasons) preseted to the ED for acute on chronic SOB not needing O2 since January. Dx with flu at that time with no complications but has not improved since then. Presented here for 2nd opinion after pulmonology outpatient workup revealed no significant findings. Associated with generalized fatigue, weakness, loss of appetite and thus less PO intake.     Pt denies CP, left arm pain, lower extremity swelling, lower extremity pain, lower extremity redness/warmth, clotting disorder.     ED course: VSS on RA. Initial EKG nsr. CXR insignificant. CTA here shows small right lower lobe segmental PE, small clot burden, no right heart strain / cor pulmonale. DVT study is positive for deep vein thrombosis identified in the right peroneal vein. PE. Tx: lovenox 1mg/kg, ivns, morphin, zofran, simethicone.   ROS   Except as documented, all other systems reviewed and negative     Past Medical History   No past medical history on file.    Past Surgical History   No past surgical history on file.    Social History     Screening for Social Drivers for health: Patient screened for food insecurity, housing " "instability, transportation needs, utility   difficulties, and interpersonal safety (select all that apply as identified as concern)  []Housing or Food  []Transportation Needs  []Utility Difficulties  []Interpersonal safety  [x]None    Social History     Tobacco Use    Smoking status: Not on file    Smokeless tobacco: Not on file   Substance Use Topics    Alcohol use: Not on file        Family History   Reviewed and negative    Allergies   Mepergan fortis    Home Medications     Prior to Admission medications    Not on File        Physical Exam   Vital Signs  Temp:  [97.9 °F (36.6 °C)]   Pulse:  [57-69]   Resp:  [16-25]   BP: (126-143)/(63-88)   SpO2:  [94 %-99 %]    General: Well developed, well nourished. In no acute distress, non-toxic appearing  HEENT: NC/AT  Neck:  Supple. No JVD  Chest: Clear bilaterally, no wheezing, no accessory muscle use.  CVS: Regular rhythm. Normal S1/S2.  Abdomen: nondistended, normoactive BS, soft and non-tender.  MSK: No obvious deformity or joint swelling  Skin: Warm and dry  Neuro: AAOx3, no focal neurological deficit  Psych: Cooperative      Labs     Recent Labs     07/20/24  1810   WBC 6.43   RBC 4.84   HGB 14.8   HCT 43.8   MCV 90.5   MCH 30.6   MCHC 33.8   RDW 15.5        No results for input(s): "PROTIME", "INR", "PTT", "D-DIMER", "FERRITIN", "IRON", "TRANS", "TIBC", "LABIRON", "OPFREZVI65", "FOLATE", "LDH", "HAPTOGLOBIN", "RETICCNTAUTO", "RETABS", "PERIPSMEAREV" in the last 72 hours.   Recent Labs     07/20/24  1810   *   K 4.8   CO2 20*   BUN 25.6*   CREATININE 1.28*   EGFRNORACEVR 42   GLUCOSE 137*   CALCIUM 9.6   ALBUMIN 3.5   GLOBULIN 3.2   ALKPHOS 60   *   AST 68*   BILITOT 1.4   BNP 80.3     No results for input(s): "LACTIC" in the last 72 hours.  Recent Labs     07/20/24  1810   TROPONINI <0.010        Microbiology Results (last 7 days)       Procedure Component Value Units Date/Time    Urine culture [[id number]] Collected: 07/20/24 1843    Order " Status: Sent Specimen: Urine, Clean Catch Updated: 07/20/24 1917           Imaging     CT Chest With Contrast   Final Result   Addendum (preliminary) 1 of 1      Findings discussed with  Boubacar Angeles Iv, the clinician caring for patient    7/20/2024 18:57.         Electronically signed by: Meenu Mills   Date:    07/20/2024   Time:    20:14      Final      1. Linear filling defect within a right lower lobe segmental arterial branch compatible with small pulmonary embolus, age indeterminate.  Small clot burden.  No evidence of right heart strain.   2. Mosaic attenuation at the lung bases as can be seen with mosaic perfusion or air trapping.  There is interstitial reticulation which may be related to scarring or interstitial lung disease.  There is a report from an outside prior CT from 2023 which describes mosaic attenuation within the lungs.  It would be helpful to compare to the outside prior images to assess for chronic disease..         Electronically signed by: Meenu Mills   Date:    07/20/2024   Time:    18:55      X-Ray Chest PA And Lateral   Final Result      No acute cardiopulmonary abnormality.         Electronically signed by: Meenu Mills   Date:    07/20/2024   Time:    17:04        Assessment & Plan   Pulmonary Embolism  Right Leg DVT  -chronic SOB not needing O2 and presented for 2nd opinion after pulmonology outpatient workup revealed no significant findings.  -CTA here shows small right lower lobe segmental PE, small clot burden, and thankfully no right heart strain / cor pulmonale. DVT study is positive for deep vein thrombosis identified in the right peroneal vein.   -BNP & Trop wnl.  -Resting comfortably with VSS.  -Anticoag intiated and we will monitor on tele overnight.  -Echo in morning.    MARCELINO  -Likely secondary to dehydration consistent with decreased PO intake last several months and fatigue/weakness.  -Repeat renal indices in morning.  -If worsening we will get Renal  US.  -Avoid nephrotoxic agents and initiate light IVNS.    General Plan:  -Telemetry monitoring  -O2 delivery as needed  -Antihypertensives as needed  -Resume home meds as appropriate  -Labs in AM       VTE Prophylaxis: Shanae Bacon Brandon Foto, PA-C have reviewed and discussed this case with Roberto Leal MD. Please see addendum for further assessment and plan from attending MD.      _______________________________________________________________  I, Dr. Roberto Leal assumed care of this patient.  For the patient encounter, I performed the substantive portion of the visit, I reviewed the NPPA documentation, treatment plan, and medical decision making.  I had face to face time with this patient.  I have personally reviewed the labs and test results that are presently available. I have reviewed or attempted to review medical records based upon their availability. If patient was admitted under observational status it is with my approval.      Pleasant 83-year-old female presents with dyspnea likely from undiagnosed interstitial lung disease (as noted for persistent my take attenuation on CT both tonight and prior back from 2023, she was awaiting outpatient pulmonary further workup).  She was found here to have a small PE in addition to evidence of interstitial lung disease.  She was not requiring oxygen.  She has am acute DVT in her right lower extremity peroneal vein.  She recently traveled to [facility] last week but denies being in the car for more than 4 hours.  She is up-to-date on age-appropriate cancer screening.  She has been on Eliquis in the past but due to cost it was decided by her cardiologist to place her on Plavix instead (per her report).  Overall this is an unprovoked DVT however recommend continued lifelong anticoagulation given AFib.    She had a heart catheterization that can be seen that was done 01/23/2024 which showed a patent LAD and otherwise nonobstructive CAD.   She may just be able to be discharged on Xarelto and baby aspirin, and Plavix can be discontinued until she follows up with her cardiologist as it does not appear she had a recent stent placed within the last year.  She sees Dr. Valderrama through [facility].  Home in a.m. if stable not requiring oxygen overnight.    Time seen:  11:00 p.m. 7/20  Roberto Leal MD

## 2024-07-22 VITALS
OXYGEN SATURATION: 96 % | DIASTOLIC BLOOD PRESSURE: 86 MMHG | WEIGHT: 163 LBS | BODY MASS INDEX: 25.58 KG/M2 | TEMPERATURE: 98 F | RESPIRATION RATE: 18 BRPM | HEART RATE: 58 BPM | SYSTOLIC BLOOD PRESSURE: 164 MMHG | HEIGHT: 67 IN

## 2024-07-22 LAB
BACTERIA UR CULT: ABNORMAL
BACTERIA UR CULT: ABNORMAL
POCT GLUCOSE: 154 MG/DL (ref 70–110)
TROPONIN I SERPL-MCNC: <0.01 NG/ML (ref 0–0.04)

## 2024-07-22 PROCEDURE — G0378 HOSPITAL OBSERVATION PER HR: HCPCS

## 2024-07-22 PROCEDURE — 36415 COLL VENOUS BLD VENIPUNCTURE: CPT | Performed by: NURSE PRACTITIONER

## 2024-07-22 PROCEDURE — 97162 PT EVAL MOD COMPLEX 30 MIN: CPT

## 2024-07-22 PROCEDURE — 84484 ASSAY OF TROPONIN QUANT: CPT | Performed by: NURSE PRACTITIONER

## 2024-07-22 PROCEDURE — 25500020 PHARM REV CODE 255: Performed by: INTERNAL MEDICINE

## 2024-07-22 PROCEDURE — 93005 ELECTROCARDIOGRAM TRACING: CPT

## 2024-07-22 PROCEDURE — 25000003 PHARM REV CODE 250: Performed by: INTERNAL MEDICINE

## 2024-07-22 PROCEDURE — 93010 ELECTROCARDIOGRAM REPORT: CPT | Mod: ,,, | Performed by: INTERNAL MEDICINE

## 2024-07-22 PROCEDURE — 25000003 PHARM REV CODE 250: Performed by: NURSE PRACTITIONER

## 2024-07-22 RX ORDER — LACTULOSE 10 G/15ML
30 SOLUTION ORAL ONCE
Status: COMPLETED | OUTPATIENT
Start: 2024-07-22 | End: 2024-07-22

## 2024-07-22 RX ORDER — POLYETHYLENE GLYCOL 3350 17 G/17G
17 POWDER, FOR SOLUTION ORAL DAILY
Status: DISCONTINUED | OUTPATIENT
Start: 2024-07-22 | End: 2024-07-22 | Stop reason: HOSPADM

## 2024-07-22 RX ORDER — LACTULOSE 10 G/15ML
20 SOLUTION ORAL ONCE
Status: COMPLETED | OUTPATIENT
Start: 2024-07-22 | End: 2024-07-22

## 2024-07-22 RX ADMIN — LACTULOSE 30 G: 10 SOLUTION ORAL at 01:07

## 2024-07-22 RX ADMIN — LACTULOSE 20 G: 10 SOLUTION ORAL at 08:07

## 2024-07-22 RX ADMIN — TRAMADOL HYDROCHLORIDE 50 MG: 50 TABLET, COATED ORAL at 04:07

## 2024-07-22 RX ADMIN — ASPIRIN 81 MG: 81 TABLET, COATED ORAL at 08:07

## 2024-07-22 RX ADMIN — POLYETHYLENE GLYCOL 3350 17 G: 17 POWDER, FOR SOLUTION ORAL at 08:07

## 2024-07-22 RX ADMIN — IOHEXOL 95 ML: 350 INJECTION, SOLUTION INTRAVENOUS at 09:07

## 2024-07-22 RX ADMIN — SPIRONOLACTONE 50 MG: 25 TABLET ORAL at 08:07

## 2024-07-22 RX ADMIN — RIVAROXABAN 15 MG: 15 TABLET, FILM COATED ORAL at 08:07

## 2024-07-22 NOTE — PLAN OF CARE
Clinical update sent to [facility] via [employer] .  [facility] clinical accept patient .

## 2024-07-22 NOTE — PLAN OF CARE
07/22/24 1425   Final Note   Assessment Type Final Discharge Note   Anticipated Discharge Disposition Home-Health   Post-Acute Status   Post-Acute Authorization Home Health     Discharge documentation sent to [facility] via [employer].

## 2024-07-22 NOTE — PLAN OF CARE
07/22/24 1049   Discharge Assessment   Assessment Type Discharge Planning Assessment   Confirmed/corrected address, phone number and insurance Yes   Confirmed Demographics Correct on Facesheet   Source of Information patient   When was your last doctors appointment?   (PCP is Dr. Sheree Rodriguez.)   Reason For Admission SOB   People in Home spouse   Do you expect to return to your current living situation? Yes   Do you have help at home or someone to help you manage your care at home? Yes   Who are your caregiver(s) and their phone number(s)? Flex/Spouse/[phone]   Current cognitive status: Alert/Oriented   Walking or Climbing Stairs Difficulty yes   Walking or Climbing Stairs ambulation difficulty, requires equipment   Mobility Management Ambulates with RW PRN   Dressing/Bathing Difficulty no   Home Accessibility wheelchair accessible   Home Layout Able to live on 1st floor   Readmission within 30 days? No   Do you currently have service(s) that help you manage your care at home? No   Do you take prescription medications? Yes   Do you have prescription coverage? Yes   Do you have any problems affording any of your prescribed medications? No   Who is going to help you get home at discharge? Spouse   How do you get to doctors appointments? car, drives self   Are you on dialysis? No   Do you take coumadin? No   Discharge Plan A Home Health   Discharge Plan B Home Health   Discharge Plan discussed with: Patient;Adult children;Spouse/sig other   Housing Stability   In the last 12 months, was there a time when you were not able to pay the mortgage or rent on time? N   At any time in the past 12 months, were you homeless or living in a shelter (including now)? N   Transportation Needs   Has the lack of transportation kept you from medical appointments, meetings, work or from getting things needed for daily living? No   Food Insecurity   Within the past 12 months, you worried that your food would run out before  you got the money to buy more. Never true   Within the past 12 months, the food you bought just didn't last and you didn't have money to get more. Never true   Utilities   In the past 12 months has the electric, gas, oil, or water company threatened to shut off services in your home? No     List of home health providers given. FOC obtained for NSI. Referral sent via [facility].  Spoke to Ochoa at [employer] Prescription. OOP for Eliquis is $93.58 monthly after deductible is met and Xarelto is $89.67 monthly  after deductible.

## 2024-07-22 NOTE — DISCHARGE SUMMARY
[facility] - 6th Floor [facility] MEDICINE - DISCHARGE SUMMARY    Patient Name: Sharee Rodriguez  MRN: [id number]  Admission Date: 7/20/2024  Discharge Date: 07/22/2024  Hospital Length of Stay: 0 days  Discharge Provider: Yuriy Luciano MD  Primary Care Provider: Sheree Rodriguez MD      HOSPITAL COURSE   84yo female with pmhx of CAD & PCI with stent, MI (2/23), DM2, HTN, HLD, CKD, Afib (Eliquis discontinued within last year by her cardiologist for unknown reasons) preseted to the ED for acute on chronic SOB not needing O2 since January. Dx with flu at that time with no complications but has not improved since then. Presented here for 2nd opinion after pulmonology outpatient workup revealed no significant findings. Associated with generalized fatigue, weakness, loss of appetite and thus less PO intake.      Pt denies CP, left arm pain, lower extremity swelling, lower extremity pain, lower extremity redness/warmth, clotting disorder.      ED course: VSS on RA. Initial EKG nsr. CXR insignificant. CTA here shows small right lower lobe segmental PE, small clot burden, no right heart strain / cor pulmonale. DVT study is positive for deep vein thrombosis identified in the right peroneal vein. PE. Tx: lovenox 1mg/kg, ivns, morphin, zofran, simethicone.       At this time she was treated for DVT and PE.  She is now willing to pay for the Eliquis out-of-pocket.  Case management has check the price for us as well.  I have prescribed the Eliquis back for her.  She does have significant weight loss history and constipation.  We did order CT abdomen which does not reveal any evidence of malignancy however I did recommend to the patient's  that she is still to have evaluation by Gastroenterology for possible colonoscopy as well.  She also was noted to have some transaminitis AST 68, .  Liver ultrasound was ordered which noted echogenic with coarsened echotexture correlating for  steatosis.  She said that she was told that she had liver enzymes elevated and cardiologist took her off of medication which I am assuming his a statin.        PHYSICAL EXAM     Most Recent Vital Signs:  Temp: 97.6 °F (36.4 °C) (07/22/24 1135)  Pulse: (!) 58 (07/22/24 1135)  Resp: 18 (07/22/24 1135)  BP: (!) 164/86 (07/22/24 1135)  SpO2: 96 % (07/22/24 1135)   GENERAL: In no acute distress, afebrile  HEENT:  CHEST: Clear to auscultation bilaterally  HEART: S1, S2, no appreciable murmur  ABDOMEN: Soft, nontender, BS +  MSK: Warm, no lower extremity edema, no clubbing or cyanosis  NEUROLOGIC: Alert and oriented x4, moving all extremities with good strength   INTEGUMENTARY:  PSYCHIATRY:          DISCHARGE DIAGNOSIS   Right lower lobe segmental pulmonary embolism  Right peroneal vein deep vein thrombosis  Mild acute kidney injury-resolved   Transaminitis  Significant weight loss history     History of: As listed above        _____________________________________________________________________________      DISCHARGE MED REC     Current Discharge Medication List        START taking these medications    Details   apixaban (ELIQUIS) 5 mg Tab Take 1 tablet (5 mg total) by mouth 2 (two) times daily.  Qty: 60 tablet, Refills: 2           CONTINUE these medications which have NOT CHANGED    Details   fluticasone-umeclidin-vilanter (TRELEGY ELLIPTA) 100-62.5-25 mcg DsDv Inhale 1 puff into the lungs once daily.      rosuvastatin (CRESTOR) 10 MG tablet Take 10 mg by mouth once daily.      spironolactone (ALDACTONE) 50 MG tablet Take 50 mg by mouth once daily.      VENTOLIN HFA 90 mcg/actuation inhaler Inhale 1 puff into the lungs every 8 (eight) hours as needed for Wheezing or Shortness of Breath.           STOP taking these medications       clopidogreL (PLAVIX) 75 mg tablet Comments:   Reason for Stopping:                  CONSULTS         FOLLOW UP      Follow-up Information       Sheree Rodriguez MD Follow up in 1  week(s).    Specialty: Family Medicine  Contact information:  [address]  [place] [address]  [phone]                                 DISCHARGE INSTRUCTIONS     Explained in detail to the patient about the discharge plan, medications, and follow-up visits. Pt understands and agrees with the treatment plan.  Discharged Condition: stable  Diet as tolerated  Activities as tolerated  Discharge to: Home or Self Care    TIME SPENT ON DISCHARGE   35 minutes        Yuriy Luciano MD  Internal Medicine  Department of Hospital Medicine [facility] - 6th Floor Medical Telemetry      This document was created using electronic dictation services.  Please excuse any errors that may have been made.  Contact me if any questions regarding documentation to clarify verbiage.

## 2024-07-22 NOTE — PT/OT/SLP EVAL
Physical Therapy Evaluation    Patient Name:  Sharee Rodriguez   MRN:  [id number]    Recommendations:     Discharge therapy intensity: Low Intensity Therapy   Discharge Equipment Recommendations: none   Barriers to discharge: Ongoing medical needs    Assessment:     Sharee Rodriguez is a 83 y.o. female admitted with a medical diagnosis of RLL PE, R peroneal DVT, MARCELINO.  She presents with the following impairments/functional limitations: weakness, impaired functional mobility, impaired endurance, gait instability, impaired balance, decreased lower extremity function, impaired cardiopulmonary response to activity. Pt tolerated session fair, limited 2/2 decreased endurance. Able to ambulate x 80' with RW prior to seated rest. Will continue to progress as pt can tolerate.     Rehab Prognosis: Good; patient would benefit from acute skilled PT services to address these deficits and reach maximum level of function.    Recent Surgery: * No surgery found *      Plan:     During this hospitalization, patient would benefit from acute PT services 5 x/week to address the identified rehab impairments via gait training, therapeutic activities, therapeutic exercises and progress toward the following goals:    Plan of Care Expires:  08/24/24    Subjective     Chief Complaint: weakness/rib pain  Patient/Family Comments/goals: to go home  Pain/Comfort:  Pain Rating 1: 5/10  Location 1: rib(s)    Patients cultural, spiritual, Rastafarian conflicts given the current situation:      Living Environment:  Pt lives with spouse antoinette  [facility] with flat entrance. 1 fall 2/2 weakness in past 2 weeks causing use of RW.   Prior to admission, patients level of function was Normally I; however, decline in past 2 weeks requiring use of RW.  Equipment used at home: walker, rolling.  DME owned (not currently used): single point cane.  Upon discharge, patient will have assistance from spouse.    Objective:     Communicated with RN prior to session.  Patient found HOB  elevated with peripheral IV, telemetry  upon PT entry to room.    General Precautions: Standard, fall  Orthopedic Precautions:N/A   Braces: N/A  Respiratory Status: Room air 93% with mobility    Exams:  RLE Strength: WFL  LLE Strength: WFL  Skin integrity: Visible skin intact      Functional Mobility:  Bed Mobility:     Supine to Sit: minimum assistance  Transfers:     Sit to Stand:  contact guard assistance with rolling walker  Bed to Chair: contact guard assistance with  rolling walker  using  Step Transfer  Gait: Pt ambulated x 80' with RW CGA prior to seated rest. Slow gait speed but no major LOB.       AM-PAC 6 CLICK MOBILITY  Total Score:21       Treatment & Education:    Patient provided with verbal education education regarding PT role/goals/POC, fall prevention, and safety awareness.  Understanding was verbalized.     Patient left up in chair with all lines intact, call button in reach, RN notified, and spouse present.    GOALS:   Multidisciplinary Problems       Physical Therapy Goals          Problem: Physical Therapy    Goal Priority Disciplines Outcome Goal Variances Interventions   Physical Therapy Goal     PT, PT/OT Progressing     Description: Goals to be met by: d/c     Patient will increase functional independence with mobility by performin. Supine to sit with Modified [place]  2. Sit to supine with Modified Harper  3. Sit to stand transfer with Modified Harper  4. Bed to chair transfer with Modified Harper using Rolling Walker  5. Gait  x 150 feet with Modified Harper using Rolling Walker.                          History:     No past medical history on file.    No past surgical history on file.    Time Tracking:     PT Received On: 24  PT Start Time: 830     PT Stop Time: 0845  PT Total Time (min): 15 min     Billable Minutes: Evaluation 15      2024

## 2024-07-22 NOTE — PLAN OF CARE
Problem: Physical Therapy  Goal: Physical Therapy Goal  Description: Goals to be met by: d/c     Patient will increase functional independence with mobility by performin. Supine to sit with Modified [place]  2. Sit to supine with Modified [place]  3. Sit to stand transfer with Modified [place]  4. Bed to chair transfer with Modified [place] using Rolling Walker  5. Gait  x 150 feet with Modified [place] using Rolling Walker.     Outcome: Progressing

## 2024-07-23 LAB
OHS QRS DURATION: 104 MS
OHS QTC CALCULATION: 428 MS

## 2024-07-24 ENCOUNTER — PATIENT OUTREACH (OUTPATIENT)
Dept: ADMINISTRATIVE | Facility: CLINIC | Age: 84
End: 2024-07-24
Payer: MEDICARE

## 2024-07-24 NOTE — PROGRESS NOTES
C3 nurse attempted to contact Sharee Rodriguez  for a TCC post hospital discharge follow up call. No answer. Left voicemail with callback information. The patient has a scheduled HOSFU appointment with Sheree Rodriguez MD July 26th @ 09:00 am

## 2024-07-25 NOTE — PROGRESS NOTES
C3 nurse spoke with Sharee Rodriguez  for a TCC post hospital discharge follow up call. The patient has a scheduled [facility] appointment with Sheree Rodriguez MD  on 7/26/24 @9am.

## 2024-08-04 ENCOUNTER — HOSPITAL ENCOUNTER (EMERGENCY)
Facility: HOSPITAL | Age: 84
Discharge: HOME | End: 2024-08-04
Attending: EMERGENCY MEDICINE
Payer: MEDICARE

## 2024-08-04 VITALS
RESPIRATION RATE: 24 BRPM | BODY MASS INDEX: 27.16 KG/M2 | DIASTOLIC BLOOD PRESSURE: 73 MMHG | SYSTOLIC BLOOD PRESSURE: 129 MMHG | HEIGHT: 65 IN | OXYGEN SATURATION: 97 % | TEMPERATURE: 98 F | WEIGHT: 163 LBS | HEART RATE: 59 BPM

## 2024-08-04 DIAGNOSIS — R06.02 SHORTNESS OF BREATH: ICD-10-CM

## 2024-08-04 DIAGNOSIS — N30.00 ACUTE CYSTITIS WITHOUT HEMATURIA: Primary | ICD-10-CM

## 2024-08-04 DIAGNOSIS — N30.90 CYSTITIS: ICD-10-CM

## 2024-08-04 LAB
ALBUMIN SERPL-MCNC: 3.3 G/DL (ref 3.4–4.8)
ALBUMIN/GLOB SERPL: 1 RATIO (ref 1.1–2)
ALP SERPL-CCNC: 90 UNIT/L (ref 40–150)
ALT SERPL-CCNC: 99 UNIT/L (ref 0–55)
ANION GAP SERPL CALC-SCNC: 11 MEQ/L
AST SERPL-CCNC: 73 UNIT/L (ref 5–34)
BACTERIA #/AREA URNS AUTO: ABNORMAL /HPF
BASOPHILS # BLD AUTO: 0.03 X10(3)/MCL
BASOPHILS NFR BLD AUTO: 0.4 %
BILIRUB SERPL-MCNC: 1.3 MG/DL
BILIRUB UR QL STRIP.AUTO: NEGATIVE
BUN SERPL-MCNC: 25.5 MG/DL (ref 9.8–20.1)
CALCIUM SERPL-MCNC: 9.5 MG/DL (ref 8.4–10.2)
CHLORIDE SERPL-SCNC: 105 MMOL/L (ref 98–107)
CLARITY UR: ABNORMAL
CO2 SERPL-SCNC: 19 MMOL/L (ref 23–31)
COLOR UR AUTO: ABNORMAL
CREAT SERPL-MCNC: 1.15 MG/DL (ref 0.55–1.02)
CREAT/UREA NIT SERPL: 22
EOSINOPHIL # BLD AUTO: 0.04 X10(3)/MCL (ref 0–0.9)
EOSINOPHIL NFR BLD AUTO: 0.5 %
ERYTHROCYTE [DISTWIDTH] IN BLOOD BY AUTOMATED COUNT: 15.6 % (ref 11.5–17)
FLUAV AG UPPER RESP QL IA.RAPID: NOT DETECTED
FLUBV AG UPPER RESP QL IA.RAPID: NOT DETECTED
GFR SERPLBLD CREATININE-BSD FMLA CKD-EPI: 48 ML/MIN/1.73/M2
GLOBULIN SER-MCNC: 3.2 GM/DL (ref 2.4–3.5)
GLUCOSE SERPL-MCNC: 100 MG/DL (ref 82–115)
GLUCOSE UR QL STRIP: NORMAL
HCT VFR BLD AUTO: 43.6 % (ref 37–47)
HGB BLD-MCNC: 14.7 G/DL (ref 12–16)
HGB UR QL STRIP: ABNORMAL
IMM GRANULOCYTES # BLD AUTO: 0.06 X10(3)/MCL (ref 0–0.04)
IMM GRANULOCYTES NFR BLD AUTO: 0.8 %
KETONES UR QL STRIP: ABNORMAL
LEUKOCYTE ESTERASE UR QL STRIP: 500
LYMPHOCYTES # BLD AUTO: 1.36 X10(3)/MCL (ref 0.6–4.6)
LYMPHOCYTES NFR BLD AUTO: 17.3 %
MCH RBC QN AUTO: 31.2 PG (ref 27–31)
MCHC RBC AUTO-ENTMCNC: 33.7 G/DL (ref 33–36)
MCV RBC AUTO: 92.6 FL (ref 80–94)
MONOCYTES # BLD AUTO: 0.52 X10(3)/MCL (ref 0.1–1.3)
MONOCYTES NFR BLD AUTO: 6.6 %
MUCOUS THREADS URNS QL MICRO: ABNORMAL /LPF
NEUTROPHILS # BLD AUTO: 5.85 X10(3)/MCL (ref 2.1–9.2)
NEUTROPHILS NFR BLD AUTO: 74.4 %
NITRITE UR QL STRIP: NEGATIVE
NRBC BLD AUTO-RTO: 0 %
PH UR STRIP: 6 [PH]
PLATELET # BLD AUTO: 260 X10(3)/MCL (ref 130–400)
PLATELETS.RETICULATED NFR BLD AUTO: 6.6 % (ref 0.9–11.2)
PMV BLD AUTO: 12.4 FL (ref 7.4–10.4)
POTASSIUM SERPL-SCNC: 4.4 MMOL/L (ref 3.5–5.1)
PROT SERPL-MCNC: 6.5 GM/DL (ref 5.8–7.6)
PROT UR QL STRIP: ABNORMAL
RBC # BLD AUTO: 4.71 X10(6)/MCL (ref 4.2–5.4)
RBC #/AREA URNS AUTO: >100 /HPF
SARS-COV-2 RNA RESP QL NAA+PROBE: NOT DETECTED
SODIUM SERPL-SCNC: 135 MMOL/L (ref 136–145)
SP GR UR STRIP.AUTO: 1.03 (ref 1–1.03)
SQUAMOUS #/AREA URNS LPF: ABNORMAL /HPF
TROPONIN I SERPL-MCNC: <0.01 NG/ML (ref 0–0.04)
UROBILINOGEN UR STRIP-ACNC: 2
WBC # BLD AUTO: 7.86 X10(3)/MCL (ref 4.5–11.5)
WBC #/AREA URNS AUTO: >100 /HPF
WBC CLUMPS UR QL AUTO: ABNORMAL

## 2024-08-04 PROCEDURE — 81001 URINALYSIS AUTO W/SCOPE: CPT | Performed by: PHYSICIAN ASSISTANT

## 2024-08-04 PROCEDURE — 93005 ELECTROCARDIOGRAM TRACING: CPT

## 2024-08-04 PROCEDURE — 99285 EMERGENCY DEPT VISIT HI MDM: CPT | Mod: 25

## 2024-08-04 PROCEDURE — 84484 ASSAY OF TROPONIN QUANT: CPT | Performed by: PHYSICIAN ASSISTANT

## 2024-08-04 PROCEDURE — 0240U COVID/FLU A&B PCR: CPT | Performed by: PHYSICIAN ASSISTANT

## 2024-08-04 PROCEDURE — 93010 ELECTROCARDIOGRAM REPORT: CPT | Mod: ,,, | Performed by: INTERNAL MEDICINE

## 2024-08-04 PROCEDURE — 25000003 PHARM REV CODE 250: Performed by: EMERGENCY MEDICINE

## 2024-08-04 PROCEDURE — 80053 COMPREHEN METABOLIC PANEL: CPT | Performed by: PHYSICIAN ASSISTANT

## 2024-08-04 PROCEDURE — 87086 URINE CULTURE/COLONY COUNT: CPT | Performed by: PHYSICIAN ASSISTANT

## 2024-08-04 PROCEDURE — 85025 COMPLETE CBC W/AUTO DIFF WBC: CPT | Performed by: PHYSICIAN ASSISTANT

## 2024-08-04 PROCEDURE — 87186 SC STD MICRODIL/AGAR DIL: CPT | Performed by: PHYSICIAN ASSISTANT

## 2024-08-04 RX ORDER — AMOXICILLIN AND CLAVULANATE POTASSIUM 875; 125 MG/1; MG/1
1 TABLET, FILM COATED ORAL 2 TIMES DAILY
Qty: 14 TABLET | Refills: 0 | Status: SHIPPED | OUTPATIENT
Start: 2024-08-04

## 2024-08-04 RX ORDER — AMOXICILLIN AND CLAVULANATE POTASSIUM 500; 125 MG/1; MG/1
1 TABLET, FILM COATED ORAL
Status: COMPLETED | OUTPATIENT
Start: 2024-08-04 | End: 2024-08-04

## 2024-08-04 RX ADMIN — AMOXICILLIN AND CLAVULANATE POTASSIUM 500 MG: 500; 125 TABLET, FILM COATED ORAL at 01:08

## 2024-08-05 LAB
OHS QRS DURATION: 80 MS
OHS QTC CALCULATION: 429 MS

## 2024-08-06 LAB — BACTERIA UR CULT: ABNORMAL
